# Patient Record
Sex: FEMALE | Race: WHITE
[De-identification: names, ages, dates, MRNs, and addresses within clinical notes are randomized per-mention and may not be internally consistent; named-entity substitution may affect disease eponyms.]

---

## 2018-03-20 LAB
BASOPHILS # BLD: 0.05 K/UL (ref 0–0.2)
BASOPHILS NFR BLD: 0.7 %
BUN SERPL-MCNC: 11 MG/DL (ref 7–18)
CALCIUM SERPL-MCNC: 8.7 MG/DL (ref 8.5–10.1)
CO2 SERPL-SCNC: 22 MMOL/L (ref 21–32)
CREAT SERPL-MCNC: 0.62 MG/DL (ref 0.6–1.2)
EOS ABS #: 0.45 K/UL (ref 0–0.5)
EOSINOPHIL NFR BLD AUTO: 298 K/UL (ref 130–400)
GLUCOSE SERPL-MCNC: 160 MG/DL (ref 70–99)
HCT VFR BLD CALC: 42.5 % (ref 37–47)
HGB BLD-MCNC: 13.9 G/DL (ref 12–16)
IG#: 0.03 K/UL (ref 0–0.02)
IMM GRANULOCYTES NFR BLD AUTO: 35.9 %
LYMPHOCYTES # BLD: 2.64 K/UL (ref 1.2–3.4)
MCH RBC QN AUTO: 30.3 PG (ref 25–34)
MCHC RBC AUTO-ENTMCNC: 32.7 G/DL (ref 32–36)
MCV RBC AUTO: 92.6 FL (ref 80–100)
MONO ABS #: 0.47 K/UL (ref 0.11–0.59)
MONOCYTES NFR BLD: 6.4 %
NEUT ABS #: 3.71 K/UL (ref 1.4–6.5)
NEUTROPHILS # BLD AUTO: 6.1 %
NEUTROPHILS NFR BLD AUTO: 50.5 %
PMV BLD AUTO: 10.2 FL (ref 7.4–10.4)
POTASSIUM SERPL-SCNC: 3.7 MMOL/L (ref 3.5–5.1)
RED CELL DISTRIBUTION WIDTH CV: 13.5 % (ref 11.5–14.5)
RED CELL DISTRIBUTION WIDTH SD: 45.9 FL (ref 36.4–46.3)
SODIUM SERPL-SCNC: 138 MMOL/L (ref 136–145)
WBC # BLD AUTO: 7.35 K/UL (ref 4.8–10.8)

## 2018-03-20 NOTE — PAT MEDICATION INSTRUCTIONS
Service Date


Mar 20, 2018.





Current Home Medication List


Albuterol Hfa (Ventolin Hfa), 2 PUFFS INH Q4H PRN for SOB/Wheezing


Butalbital-Acetaminophen-Caffe (Fioricet), 1-2 CAP PO UD


Cholecalciferol (Vitamin D3), 2,000 INTER.UNIT PO QAM


Cyanocobalamin (Vitamin B-12), 1,000 MCG PO QAM


Cyclobenzaprine HCl (Cyclobenzaprine HCl), 10 MG PO TID PRN for Muscle Spasm


Diphenhydramine Hcl (Benadryl), 50 MG PO QPM


Duloxetine HCl (Duloxetine HCl), 30 MG PO QAM


Duloxetine HCl (Duloxetine HCl), 60 MG PO QAM


Fexofenadine-Pseudoephedrine (Allegra-D 24 Hour Allergy), 1 TAB PO QPM


Fluticasone Prop/Salmeterol (Advair Diskus 250/50 60 Dose), 1 PUFF INH BID


Fluticasone Propionate (Fluticasone Propionate), 2 SPRAYS LYNN QPM


Labetalol HCl (Labetalol HCl), 200 MG PO BID


Levonorgestrel (Iud) (Mirena), 20 MCG IU UD


Melatonin (Melatonin Maximum Strengt), 1 TAB PO HS


Multivitamin (Multivitamin), 1 TAB PO QPM


Olopatadine HCl (Olopatadine Hydrochloride), 1 DROP OP DAILY PRN for Allergy 

Symptoms


Pregabalin (Lyrica), 150 MG PO TID


Probiotic Product (Probiotic), 1 TAB PO BID


Quetiapine Fumarate (Seroquel), 25 MG PO BID PRN for PRN


Ranitidine HCl (Ranitidine HCl), 150 MG PO BID


Sertraline (Zoloft), 1 TAB PO QAM


Topiramate (Topamax), 300 MG PO BID


Triamcinolone Acet (Triamcinolone Acetonide), 1 APPLN TOP DAILY PRN for 

UNDECIDED


Turmeric (Curcuma Longa) (Turmeric Curcumin), 500 MG PO QPM


Zafirlukast (Accolate), 20 MG PO BID


Zolpidem Tartrate (Zolpidem Tartrate), 10 MG PO HS PRN for PRN


[Albuterol Neb], 2 PUFFS INH PRN


[Botox], 1 DOSE INJ [id number]


[Nystatin Powd], 1 DOSE TOP PRN


[Vitamin E], 500 MG PO QAM





Medication Instructions


For Your Scheduled Surgery 





-Continue as directed:


Levonorgestrel (Iud) (Mirena), 20 MCG IU UD


Butalbital-Acetaminophen-Caffe (Fioricet), 1-2 CAP PO UD


[Botox], 1 DOSE INJ [id number]








- Hold the following medications 2 weeks prior to surgery:


Turmeric (Curcuma Longa) (Turmeric Curcumin), 500 MG PO QPM


[Vitamin E], 500 MG PO QAM








- Hold the following medications 24 hours prior to surgery:


Triamcinolone Acet (Triamcinolone Acetonide), 1 APPLN TOP DAILY PRN for 

UNDECIDED


[Nystatin Powd], 1 DOSE TOP PRN








- Hold the following medications the morning of surgery:


Cholecalciferol (Vitamin D3), 2,000 INTER.UNIT PO QAM


Cyanocobalamin (Vitamin B-12), 1,000 MCG PO QAM


Cyclobenzaprine HCl (Cyclobenzaprine HCl), 10 MG PO TID PRN for Muscle Spasm


Probiotic Product (Probiotic), 1 TAB PO BID








- Take the following medications the morning of surgery with a sip of water:


Albuterol Hfa (Ventolin Hfa), 2 PUFFS INH Q4H PRN for SOB/Wheezing (if needed, 

and bring it with you on the day of surgery)


Duloxetine HCl (Duloxetine HCl), 60 MG PO QAM


Fluticasone Prop/Salmeterol (Advair Diskus 250/50 60 Dose), 1 PUFF INH BID


Labetalol HCl (Labetalol HCl), 200 MG PO BID


Olopatadine HCl (Olopatadine Hydrochloride), 1 DROP OP DAILY PRN for Allergy 

Symptoms (if needed)


Pregabalin (Lyrica), 150 MG PO TID


Quetiapine Fumarate (Seroquel), 25 MG PO BID PRN for PRN (if needed)


Ranitidine HCl (Ranitidine HCl), 150 MG PO BID


Sertraline (Zoloft), 1 TAB PO QAM


Topiramate (Topamax), 300 MG PO BID


Zafirlukast (Accolate), 20 MG PO BID


[Albuterol Neb], 2 PUFFS INH PRN (if needed)








- Take the following medications as scheduled the night before surgery:


Albuterol Hfa (Ventolin Hfa), 2 PUFFS INH Q4H PRN for SOB/Wheezing (if needed)


Cyclobenzaprine HCl (Cyclobenzaprine HCl), 10 MG PO TID PRN for Muscle Spasm (

if needed)


Diphenhydramine Hcl (Benadryl), 50 MG PO QPM


Duloxetine HCl (Duloxetine HCl), 30 MG PO QPM


Fexofenadine-Pseudoephedrine (Allegra-D 24 Hour Allergy), 1 TAB PO QPM


Fluticasone Prop/Salmeterol (Advair Diskus 250/50 60 Dose), 1 PUFF INH BID


Fluticasone Propionate (Fluticasone Propionate), 2 SPRAYS LYNN QPM


Labetalol HCl (Labetalol HCl), 200 MG PO BID


Melatonin (Melatonin Maximum Strengt), 1 TAB PO HS


Multivitamin (Multivitamin), 1 TAB PO QPM


Olopatadine HCl (Olopatadine Hydrochloride), 1 DROP OP DAILY PRN for Allergy 

Symptoms (if needed)


Pregabalin (Lyrica), 150 MG PO TID


Probiotic Product (Probiotic), 1 TAB PO BID


Quetiapine Fumarate (Seroquel), 25 MG PO BID PRN for PRN (if needed)


Ranitidine HCl (Ranitidine HCl), 150 MG PO BID


Zafirlukast (Accolate), 20 MG PO BID


Zolpidem Tartrate (Zolpidem Tartrate), 10 MG PO HS PRN for PRN (if needed)


[Albuterol Neb], 2 PUFFS INH PRN (if needed)








If you have any questions please call us at [phone] or [phone] or 

[phone]

## 2018-04-03 ENCOUNTER — HOSPITAL ENCOUNTER (EMERGENCY)
Dept: HOSPITAL 45 - C.EDB | Age: 43
Discharge: HOME | End: 2018-04-03
Payer: COMMERCIAL

## 2018-04-03 VITALS
HEIGHT: 67.99 IN | BODY MASS INDEX: 35.75 KG/M2 | BODY MASS INDEX: 35.75 KG/M2 | WEIGHT: 235.89 LBS | HEIGHT: 67.99 IN | WEIGHT: 235.89 LBS

## 2018-04-03 VITALS — SYSTOLIC BLOOD PRESSURE: 114 MMHG | DIASTOLIC BLOOD PRESSURE: 87 MMHG | OXYGEN SATURATION: 97 % | HEART RATE: 90 BPM

## 2018-04-03 VITALS — TEMPERATURE: 98.6 F

## 2018-04-03 DIAGNOSIS — F32.9: ICD-10-CM

## 2018-04-03 DIAGNOSIS — J06.9: Primary | ICD-10-CM

## 2018-04-03 DIAGNOSIS — J45.901: ICD-10-CM

## 2018-04-03 NOTE — EMERGENCY ROOM VISIT NOTE
ED Visit Note


First contact with patient:  09:45


I have seen and examined this patient with Kyle Regan and generally agree 

with the treatment plan as discussed.


Problem List


Medical Problems:


(1) Asthma


Status: Chronic  





(2) Bipolar disorder


Status: Chronic  











Current/Historical Medications


Scheduled


Azithromycin (Zithromax Z-Nathaniel), 0 PO UD


Butalbital-Acetaminophen-Caffe (Fioricet), 1-2 CAP PO UD


Cholecalciferol (Vitamin D3), 2,000 INTER.UNIT PO QAM


Cyanocobalamin (Vitamin B-12), 1,000 MCG PO QAM


Diphenhydramine Hcl (Benadryl), 50 MG PO QPM


Duloxetine HCl (Duloxetine HCl), 30 MG PO QAM


Duloxetine HCl (Duloxetine HCl), 60 MG PO QAM


Fexofenadine-Pseudoephedrine (Allegra-D 24 Hour Allergy), 1 TAB PO QPM


Fluticasone Prop/Salmeterol (Advair Diskus 250/50 60 Dose), 1 PUFF INH BID


Fluticasone Propionate (Fluticasone Propionate), 2 SPRAYS LYNN QPM


Labetalol HCl (Labetalol HCl), 200 MG PO BID


Levonorgestrel (Iud) (Mirena), 20 MCG IU UD


Melatonin (Melatonin Maximum Strengt), 1 TAB PO HS


Methylprednisolone (Medrol Dosepak), 0 PO DAILY


Multivitamin (Multivitamin), 1 TAB PO QPM


Pregabalin (Lyrica), 150 MG PO TID


Probiotic Product (Probiotic), 1 TAB PO BID


Ranitidine HCl (Ranitidine HCl), 150 MG PO BID


Sertraline (Zoloft), 50 MG PO QAM


Topiramate (Topamax), 300 MG PO BID


Turmeric (Curcuma Longa) (Turmeric Curcumin), 500 MG PO QPM


Zafirlukast (Accolate), 20 MG PO BID





Scheduled PRN


Albuterol Hfa (Ventolin Hfa), 2 PUFFS INH Q4H PRN for SOB/Wheezing


Cyclobenzaprine HCl (Cyclobenzaprine HCl), 10 MG PO TID PRN for Muscle Spasm


Olopatadine HCl (Olopatadine Hydrochloride), 1 DROP OP DAILY PRN for Allergy 

Symptoms


Quetiapine Fumarate (Seroquel), 25 MG PO BID PRN for PRN


Triamcinolone Acet (Triamcinolone Acetonide), 1 APPLN TOP DAILY PRN for 

UNDECIDED


Zolpidem Tartrate (Zolpidem Tartrate), 10 MG PO HS PRN for PRN





Allergies


Coded Allergies:  


     Banana (Verified  Allergy, Unknown, MOUTH BURNS, 4/3/18)


     Macrolides (Verified  Allergy, Unknown, BIAXIN-GI UPSET RASH, 4/3/18)


     NUTS (Verified  Allergy, Unknown, MOUTH BURNS HIVES MIGRAINES WITH SMELL 

OF NUTS, 4/3/18)


     Sulfa Antibiotics (Verified  Allergy, Unknown, RASH, 4/3/18)


     Amoxicillin (Verified  Adverse Reaction, Intermediate, SEVERE STOMACH PAIN 

HEADACHES, 4/3/18)


 RECENT USE NO PROBLEM


     Doxycycline (Verified  Adverse Reaction, Intermediate, MIGRAINE, IMMEDIATE 

BODYACHE ESPECIALLY BAD IN JOINTS, 4/3/18)


     Clarithromycin (Verified  Adverse Reaction, Mild, GI-UPSET, 4/3/18)


     Levofloxacin (Verified  Adverse Reaction, Mild, MIGRAINE,BAD BODYACHES 

MUSCLE PAIN, 4/3/18)


     Celery (Verified  Adverse Reaction, Unknown, MOUTH BURNS, 4/3/18)


     Cephalexin (Verified  Adverse Reaction, Unknown, DOESN'T REMEMBER, 4/3/18)


     Prednisone (Verified  Adverse Reaction, Unknown, HARDER TO CONTROL BIPOLAR 

SXS, 4/3/18)





Vital Signs











  Date Time  Temp Pulse Resp B/P (MAP) Pulse Ox O2 Delivery O2 Flow Rate FiO2


 


4/3/18 10:15      Room Air  


 


4/3/18 09:41 37.0 89 20 136/90 97   











Medications Administered











 Medications


  (Trade)  Dose


 Ordered  Sig/Scott


 Route  Start Time


 Stop Time Status Last Admin


Dose Admin


 


 Albuterol/


 Ipratropium


  (Duoneb)  3 ml  NOW  STAT


 INH  4/3/18 10:23


 4/3/18 10:24 DC 4/3/18 10:31


3 ML











Departure Information


Impression





 Primary Impression:  


 Viral upper respiratory tract infection with cough


 Additional Impression:  


 Asthma





Dispostion


Home / Self-Care





Prescriptions





Azithromycin (ZITHROMAX Z-NATHANIEL) 250 Mg Tab


0 PO UD, #1 PKT


   2 TABS DAY 1, THEN 1 TAB DAILY FOR 4 DAYS


   Prov: Kyle Regan PA         4/3/18 


Methylprednisolone (MEDROL DOSEPAK) 4 Mg Nathaniel


0 PO DAILY, #1 PKT


   Prov: Kyle Regan PA         4/3/18





Forms


HOME CARE DOCUMENTATION FORM,                                                 

               IMPORTANT VISIT INFORMATION





Patient Instructions


My [facility]





Additional Instructions





Complete Zithromax and Medrol Dosepak as prescribed.


Administer albuterol 2 puffs every 4 hours.


Follow-up with your PCP if symptoms are not improving within the next 3-5 days.


Return to the emergency department for any progressively worsening symptoms.





Problem Qualifiers

## 2018-04-03 NOTE — EMERGENCY ROOM VISIT NOTE
History


First contact with patient:  09:45


Chief Complaint:  COUGH


Stated Complaint:  COUGH,WHEEZING,SOB,FEVER


Nursing Triage Summary:  


pt to the ED with c/o I think i have bronchitis and called my dr and they told 


me to come to the ED bc they had nothing availble





+ productive cough with dark yellow sputum for 24 hrs





History of Present Illness


The patient is a 43 year old female who presents to the Emergency Room with 

complaints of cough, wheezing, shortness of breath and fever.  The patient 

reports that she developed upper respiratory symptoms 2 days ago.  She now has 

a productive cough with dark yellow sputum and wheezing.  The patient does have 

a history of asthma.  She tried to call her family doctor's office for an 

appointment, but was referred here to the emergency department because he did 

not have any office appointments.  The patient has been administering albuterol 

inhalers at home without any significant relief.  She has had chills, but has 

not had a fever when checking her oral temperature.





Review of Systems


10 system review was performed and was negative except for pertinent positives 

and negatives as indicated in history of present illness





Past Medical/Surgical History


Medical Problems:


(1) Asthma


(2) Bipolar disorder








Family History


Unremarkable





Social History


Smoking Status:  Never Smoker


Alcohol Use:  occasionally


Marital Status:  


Housing Status:  lives with family


Occupation Status:  disabled





Current/Historical Medications


Scheduled


Azithromycin (Zithromax Z-Keren), 0 PO UD


Butalbital-Acetaminophen-Caffe (Fioricet), 1-2 CAP PO UD


Cholecalciferol (Vitamin D3), 2,000 INTER.UNIT PO QAM


Cyanocobalamin (Vitamin B-12), 1,000 MCG PO QAM


Diphenhydramine Hcl (Benadryl), 50 MG PO QPM


Duloxetine HCl (Duloxetine HCl), 30 MG PO QAM


Duloxetine HCl (Duloxetine HCl), 60 MG PO QAM


Fexofenadine-Pseudoephedrine (Allegra-D 24 Hour Allergy), 1 TAB PO QPM


Fluticasone Prop/Salmeterol (Advair Diskus 250/50 60 Dose), 1 PUFF INH BID


Fluticasone Propionate (Fluticasone Propionate), 2 SPRAYS LYNN QPM


Labetalol HCl (Labetalol HCl), 200 MG PO BID


Levonorgestrel (Iud) (Mirena), 20 MCG IU UD


Melatonin (Melatonin Maximum Strengt), 1 TAB PO HS


Methylprednisolone (Medrol Dosepak), 0 PO DAILY


Multivitamin (Multivitamin), 1 TAB PO QPM


Pregabalin (Lyrica), 150 MG PO TID


Probiotic Product (Probiotic), 1 TAB PO BID


Ranitidine HCl (Ranitidine HCl), 150 MG PO BID


Sertraline (Zoloft), 50 MG PO QAM


Topiramate (Topamax), 300 MG PO BID


Turmeric (Curcuma Longa) (Turmeric Curcumin), 500 MG PO QPM


Zafirlukast (Accolate), 20 MG PO BID





Scheduled PRN


Albuterol Hfa (Ventolin Hfa), 2 PUFFS INH Q4H PRN for SOB/Wheezing


Cyclobenzaprine HCl (Cyclobenzaprine HCl), 10 MG PO TID PRN for Muscle Spasm


Olopatadine HCl (Olopatadine Hydrochloride), 1 DROP OP DAILY PRN for Allergy 

Symptoms


Quetiapine Fumarate (Seroquel), 25 MG PO BID PRN for PRN


Triamcinolone Acet (Triamcinolone Acetonide), 1 APPLN TOP DAILY PRN for 

UNDECIDED


Zolpidem Tartrate (Zolpidem Tartrate), 10 MG PO HS PRN for PRN





Physical Exam


Vital Signs











  Date Time  Temp Pulse Resp B/P (MAP) Pulse Ox O2 Delivery O2 Flow Rate FiO2


 


4/3/18 10:15      Room Air  


 


4/3/18 09:41 37.0 89 20 136/90 97   











Physical Exam


CONSTITUTIONAL:  Healthy and well nourished.  Patient does not appear toxic or 

in any significant respiratory distress.


HEENT:  Normocephalic, atraumatic.  Pupils equal, round and reactive.  

Examination shows bilateral TM bulging without air-fluid levels or purulent 

effusion.  Mild clear rhinorrhea noted.


OROPHARYNX: Minimal posterior pharyngeal erythema without tonsillar hypertrophy 

or exudates.


LYMPHATICS: No cervical chain adenopathy noted.


NECK:  Full active range of motion without discomfort.


RESPIRATORY: Auscultation shows mild end expiratory wheezing in all fields.  No 

crackles, rhonchi or stridor.


CARDIOVASCULAR:  Regular rate and rhythm with no murmurs, rubs or gallops.


GASTROINTESTINAL:  Bowel sounds present in all quadrants.  


MUSCULOSKELETAL:  Full range of motion of all joints without discomfort.


INTEGUMENTARY:  No rash or other significant dermatologic conditions noted.


NEUROLOGIC:  No focal neurologic deficits noted.





Medical Decision & Procedures


Procedure


The patient was administered a unit dose DuoNeb treatment





ED Course


Patient history and physical exam were performed.  Nurse's notes were reviewed.

  Vital signs were reviewed, showing that the patient is afebrile.  O2 

saturation is 97% on room air.  The pressure is mildly elevated at 136/90.  The 

patient was administered a unit dose DuoNeb treatment with moderate relief of 

her cough.  She will be provided prescriptions for a Z-Keren and Medrol Dosepak.  

The patient was encouraged to follow-up with her PCP if symptoms are not 

improving within the next 3-5 days.  Return to the emergency department for any 

significantly worsening symptoms.  The patient was happy with plan of care, and 

voiced understanding of all discharge instructions.





Medical Decision








Blood Pressure Screening


Patient's blood pressure:  Elevated blood pressure


Blood pressure disposition:  Did not require urgent referral





Impression





 Primary Impression:  


 Viral upper respiratory tract infection with cough


 Additional Impression:  


 Asthma





Departure Information


Dispostion


Home / Self-Care





Prescriptions





Azithromycin (ZITHROMAX Z-KEREN) 250 Mg Tab


0 PO UD, #1 PKT


   2 TABS DAY 1, THEN 1 TAB DAILY FOR 4 DAYS


   Prov: Kyle Regan PA         4/3/18 


Methylprednisolone (MEDROL DOSEPAK) 4 Mg Keren


0 PO DAILY, #1 PKT


   Prov: Kyle Regan PA         4/3/18





Forms


HOME CARE DOCUMENTATION FORM,                                                 

               IMPORTANT VISIT INFORMATION





Patient Instructions


[facility]





Additional Instructions





Complete Zithromax and Medrol Dosepak as prescribed.


Administer albuterol 2 puffs every 4 hours.


Follow-up with your PCP if symptoms are not improving within the next 3-5 days.


Return to the emergency department for any progressively worsening symptoms.





Problem Qualifiers








 Additional Impression:  


 Asthma


 Asthma severity:  mild  Asthma persistence:  unspecified  Asthma complication 

type:  with acute exacerbation  Qualified Codes:  J45.901 - Unspecified asthma 

with (acute) exacerbation

## 2018-04-16 ENCOUNTER — HOSPITAL ENCOUNTER (OUTPATIENT)
Dept: HOSPITAL 45 - X.SURG | Age: 43
Discharge: HOME | End: 2018-04-16
Attending: OTOLARYNGOLOGY
Payer: COMMERCIAL

## 2018-04-16 VITALS
HEIGHT: 67.01 IN | BODY MASS INDEX: 37.06 KG/M2 | HEIGHT: 67.01 IN | WEIGHT: 236.12 LBS | WEIGHT: 236.12 LBS | BODY MASS INDEX: 37.06 KG/M2

## 2018-04-16 VITALS — SYSTOLIC BLOOD PRESSURE: 120 MMHG | DIASTOLIC BLOOD PRESSURE: 79 MMHG | OXYGEN SATURATION: 94 % | HEART RATE: 74 BPM

## 2018-04-16 VITALS — TEMPERATURE: 97.52 F

## 2018-04-16 DIAGNOSIS — M79.7: ICD-10-CM

## 2018-04-16 DIAGNOSIS — Z90.89: ICD-10-CM

## 2018-04-16 DIAGNOSIS — M19.90: ICD-10-CM

## 2018-04-16 DIAGNOSIS — E66.9: ICD-10-CM

## 2018-04-16 DIAGNOSIS — I10: ICD-10-CM

## 2018-04-16 DIAGNOSIS — Z91.018: ICD-10-CM

## 2018-04-16 DIAGNOSIS — Z88.8: ICD-10-CM

## 2018-04-16 DIAGNOSIS — Z98.890: ICD-10-CM

## 2018-04-16 DIAGNOSIS — J35.01: Primary | ICD-10-CM

## 2018-04-16 DIAGNOSIS — F31.9: ICD-10-CM

## 2018-04-16 DIAGNOSIS — Z88.2: ICD-10-CM

## 2018-04-16 DIAGNOSIS — Z88.1: ICD-10-CM

## 2018-04-16 DIAGNOSIS — J45.909: ICD-10-CM

## 2018-04-16 DIAGNOSIS — Z79.899: ICD-10-CM

## 2018-04-16 RX ADMIN — FENTANYL CITRATE PRN MCG: 50 INJECTION, SOLUTION INTRAMUSCULAR; INTRAVENOUS at 12:29

## 2018-04-16 RX ADMIN — FENTANYL CITRATE PRN MCG: 50 INJECTION, SOLUTION INTRAMUSCULAR; INTRAVENOUS at 12:37

## 2018-04-16 RX ADMIN — FENTANYL CITRATE PRN MCG: 50 INJECTION, SOLUTION INTRAMUSCULAR; INTRAVENOUS at 12:15

## 2018-04-16 RX ADMIN — FENTANYL CITRATE PRN MCG: 50 INJECTION, SOLUTION INTRAMUSCULAR; INTRAVENOUS at 12:21

## 2018-04-16 NOTE — [facility] OPERATIVE REPORT
Operative Report


Operative Date


Apr 16, 2018.





Pre-Operative Diagnosis





Chronic Tonsillitis





Post-Operative Diagnosis





same as preop





Procedure(s) Performed





Tonsillectomy





Surgeon


Dr. Dykes





Assistant Surgeon(s)


none





Estimated Blood Loss


5ml





Findings


1. NO ADENOIDS


2. 2-3+ CRYPTIC TONSILS WITH TONSILLITH FORMATION





Specimens





A: Right Tonsil





B: Left Tonsil





Anesthesia Type


General


I attest to the content of the Intraoperative Record and any orders documented 

therein.  Any exceptions are noted below.

## 2018-04-16 NOTE — DISCHARGE INSTRUCTIONS
Discharge Instructions


Date of Service


Apr 16, 2018.





Admission


Reason for Admission:  Chronic Tonsillitis





Discharge


Discharge Diagnosis / Problem:  SAME





Discharge Goals


Goal(s):  Therapeutic intervention





Activity Recommendations


Activity Limitations:  as noted below


LIGHT ACTIVITY FOR 2 WEEKS; NO DRIVING WHILE ON HYDROCODONE/APAP


.





Current Hospital Diet


Patient's current hospital diet: Full Liquid Diet





Discharge Diet


Recommended Diet:  Full Liquid Diet


Diet Texture:  Mechanical Soft (ground)





Procedures


Procedures Performed:  


Tonsillectomy





Pending Studies


Studies pending at discharge:  no





Laboratory Results





Hemoglobin A1c








Test


  1/16/18


07:40 Range/Units


 


 


Estimated Average Glucose 134   mg/dl


 


Hemoglobin A1c 6.3 H 4.5-5.6  %








Lipid Panel








Test


  1/16/18


07:40 Range/Units


 


 


Triglycerides Level 223 H 0-150  mg/dl


 


Cholesterol Level 189  0-200  mg/dl


 


HDL Cholesterol 39   mg/dl


 


Cholesterol/HDL Ratio 4.8   


 


LDL Cholesterol, Calculated 105   mg/dl











Medical Emergencies








.


Who to Call and When:





Medical Emergencies:  If at any time you feel your situation is an emergency, 

please call 911 immediately.





.





Non-Emergent Contact


Non-Emergency issues call your:  Surgeon





.


.








"Provider Documentation" section prepared by Toni Dykes.








.

## 2018-04-16 NOTE — OPERATIVE REPORT
DATE OF OPERATION:  04/16/2018

 

PREOPERATIVE DIAGNOSIS:  Chronic tonsillitis.

 

POSTOPERATIVE DIAGNOSIS:  Chronic tonsillitis.

 

PROCEDURE:  Bilateral tonsillectomy.

 

SURGEON:  Toni Dykes MD

 

ANESTHESIA:  General endotracheal.

 

ESTIMATED BLOOD LOSS:  5 mL.

 

FINDINGS:  2 to 3+ cryptic tonsils bilaterally with excessive tonsillith

formation.

 

SPECIMENS:  Right and left tonsil sent separately for permanent pathologic

specimen.

 

COMPLICATIONS:  None.

 

INDICATIONS FOR THE PROCEDURE:  The patient is a 43-year-old female with a

history of chronic tonsillitis, which has been refractory to maximal medical

therapy including antibiotics as well as preventive measures with gargling

after eating.  She presents for the above-mentioned procedure on an

outpatient elective basis.

 

DETAILS OF THE PROCEDURE:  After informed consent had been obtained from the

patient, the patient was wheeled to the operating room and placed on the

operating table in the supine position.  Monitors were placed.  After

induction of general endotracheal anesthesia, the table was turned to 90

degrees and the patient's head and neck were gently extended.  Antibiotic

ointment was applied to the lips and a mouth gag was carefully inserted,

opened, and stabilized on a roll of towels.  The palate was inspected and

found to be normal.  A catheter was then inserted into the right nasal cavity

and this was used to elevate the soft palate and uvula.  A laryngeal mirror

was used to inspect the nasopharynx and the intraoperative findings were of

no evidence of adenoid tissue.  The catheter was then removed.  An Allis

clamp was then used to grasp the right tonsil and the superior pole and Bovie

electrocautery was used to remove the tonsil in the capsular plane with care

to preserve the underlying mucosa and musculature of the anterior and

posterior tonsillar pillars.  The left tonsil was then removed in a similar

fashion.  The intraoperative findings of 2-3+ tonsils bilaterally with

excessive tonsillith formation.  These were sent separately for permanent

pathological assessment.

 

The mouth gag was then released for 1 minute.  This was reopened and

hemostasis was confirmed.  An orogastric tube was placed and the stomach was

suctioned free of air and stomach contents.  2% lidocaine jelly was placed

into the bilateral tonsillar fossae for added anesthetic effects.

 

This marked the end of the case.  The patient tolerated the procedure well

and there were no apparent complications.  The patient was extubated and

transferred to recovery room in stable condition.

 

 

I attest to the content of the Intraoperative Record and any orders documented therein. Any exception
s are noted below.

## 2018-04-16 NOTE — HISTORY AND PHYSICAL: SURG CNT
History & Physical


Date


2018.





Chief Complaint


CHRONIC TONSILLITIS





History of Present Illness


The patient is a 43 year old female with complaints of CHRONIC TONSILLITIS WITH 

SYMPTOMS DESPITE MAXIMAL MEDICAL RX AND PREVENTION WITH GARGLING AFTER EATING.








Past Medical/Surgical History


Medical Problems:


(1) Asthma


(2) Bipolar disorder


ANXIETY, FIBROMYALGIA, ALLERGIC RHINITIS, OBESITY, CHEMA, HEADACHES, VIT D 

DEFICIENCY


PSH:


S/P APPY, S/P BREAST REDUCTION, S/P , S/P FOOT AND KNEE SURGERY, S/P 

LYMPH NODE EXCISION, S/P ORAL SURGERY, S/P WRIST SURGERY





Additional History


Hepatic Disease:  No


Endocrine Disorder:  No


Kidney Disease:  No


Hypertension:  No


Heart Disease:  No


Bleeding Tendencies:  No


Infectious Diseases:  No





Allergies


Coded Allergies:  


     Banana (Verified  Allergy, Unknown, MOUTH BURNS, 18)


 Patient states no longer allergic


     Macrolides (Verified  Allergy, Unknown, BIAXIN-GI UPSET RASH, 18)


     NUTS (Verified  Allergy, Unknown, MOUTH BURNS HIVES MIGRAINES WITH SMELL 

OF NUTS, 18)


     Sulfa Antibiotics (Verified  Allergy, Unknown, RASH, 18)


     Amoxicillin (Verified  Adverse Reaction, Intermediate, SEVERE STOMACH PAIN 

HEADACHES, 18)


 RECENT USE NO PROBLEM


     Doxycycline (Verified  Adverse Reaction, Intermediate, MIGRAINE, IMMEDIATE 

BODYACHE ESPECIALLY BAD IN JOINTS, 18)


     Clarithromycin (Verified  Adverse Reaction, Mild, GI-UPSET, 18)


     Levofloxacin (Verified  Adverse Reaction, Mild, MIGRAINE,BAD BODYACHES 

MUSCLE PAIN, 18)


 Patient states no longer allergic


     Celery (Verified  Adverse Reaction, Unknown, MOUTH BURNS, 18)


     Cephalexin (Verified  Adverse Reaction, Unknown, DOESN'T REMEMBER, 18)


     Prednisone (Verified  Adverse Reaction, Unknown, HARDER TO CONTROL BIPOLAR 

SXS, 18)





Home Medications


Scheduled


Butalbital-Acetaminophen-Caffe (Fioricet), 1-2 CAP PO UD


Cholecalciferol (Vitamin D3), 2,000 INTER.UNIT PO QAM


Cyanocobalamin (Vitamin B-12), 1,000 MCG PO QAM


Diphenhydramine Hcl (Benadryl), 50 MG PO QPM


Duloxetine HCl (Duloxetine HCl), 30 MG PO QAM


Duloxetine HCl (Duloxetine HCl), 60 MG PO QAM


Fexofenadine-Pseudoephedrine (Allegra-D 24 Hour Allergy), 1 TAB PO QPM


Fluticasone Prop/Salmeterol (Advair Diskus 250/50 60 Dose), 1 PUFF INH BID


Fluticasone Propionate (Fluticasone Propionate), 2 SPRAYS LYNN QPM


Labetalol HCl (Labetalol HCl), 200 MG PO BID


Levonorgestrel (Iud) (Mirena), 20 MCG IU UD


Melatonin (Melatonin Maximum Strengt), 1 TAB PO HS


Multivitamin (Multivitamin), 1 TAB PO QPM


Pregabalin (Lyrica), 150 MG PO TID


Probiotic Product (Probiotic), 1 TAB PO BID


Ranitidine HCl (Ranitidine HCl), 150 MG PO BID


Sertraline (Zoloft), 50 MG PO QAM


Topiramate (Topamax), 300 MG PO BID


Turmeric (Curcuma Longa) (Turmeric Curcumin), 500 MG PO QPM


Zafirlukast (Accolate), 20 MG PO BID





Scheduled PRN


Albuterol Hfa (Ventolin Hfa), 2 PUFFS INH Q4H PRN for SOB/Wheezing


Cyclobenzaprine HCl (Cyclobenzaprine HCl), 10 MG PO TID PRN for Muscle Spasm


Olopatadine HCl (Olopatadine Hydrochloride), 1 DROP OP DAILY PRN for Allergy 

Symptoms


Quetiapine Fumarate (Seroquel), 25 MG PO BID PRN for PRN


Triamcinolone Acet (Triamcinolone Acetonide), 1 APPLN TOP DAILY PRN for 

UNDECIDED


Zolpidem Tartrate (Zolpidem Tartrate), 10 MG PO HS PRN for PRN





Physical Examination


Skin:  warm/dry, no rash


Eyes:  normal inspection, EOMI, sclerae normal


ENT:  + pertinent finding (2-3+ CRYPTIC TONSILS WITH TONSILLITHS)


Head:  normocephalic, atraumatic


Neck:  supple, no adenopathy, trachea midline


Respiratory/Chest:  lungs clear, normal breath sounds, no respiratory distress


Cardiovascular:  regular rate, rhythm, no edema, no murmur


Neurologic/Psych:  no motor/sensory deficits, alert, normal reflexes, oriented 

x 3





Diagnosis


CHRONIC TONSILLITIS





Plan of Treatment


TONSILLECTOMY, POSSIBLE ADENOIDECTOMY

## 2018-04-16 NOTE — ANESTHESIOLOGY PROGRESS NOTE
Anesthesia Post Op Note


Date & Time


Apr 16, 2018 at 13:37





Vital Signs


Pain Intensity:  7.0





Vital Signs Past 12 Hours








  Date Time  Temp Pulse Resp B/P (MAP) Pulse Ox O2 Delivery O2 Flow Rate FiO2


 


4/16/18 13:30  74 16 120/79 (93) 94 Room Air  


 


4/16/18 12:53  99 20 120/72 (88) 95 Room Air  


 


4/16/18 12:44 36.4 78 16 106/70 95 Room Air  


 


4/16/18 12:43  82 23     


 


4/16/18 12:43  84 23  95   


 


4/16/18 12:41    111/75    


 


4/16/18 12:38  83 15     


 


4/16/18 12:38  85 15  95   


 


4/16/18 12:36    113/67    


 


4/16/18 12:33  78 13  95   


 


4/16/18 12:33  79 13     


 


4/16/18 12:31    115/70    


 


4/16/18 12:28  80 22     


 


4/16/18 12:28  81 22  100   


 


4/16/18 12:26    111/72    


 


4/16/18 12:23  78 13     


 


4/16/18 12:23  78 13  99   


 


4/16/18 12:20    115/71    


 


4/16/18 12:18  75 14     


 


4/16/18 12:18  74 14  100   


 


4/16/18 12:16    103/70    


 


4/16/18 12:13  72 13  100   


 


4/16/18 12:13  71 13     


 


4/16/18 12:11    115/63    


 


4/16/18 12:08  76 15  98   


 


4/16/18 12:08  75 15     


 


4/16/18 12:06    112/71    


 


4/16/18 12:03  76 14     


 


4/16/18 12:03  75 14  98   


 


4/16/18 12:00    116/77    


 


4/16/18 11:58  76 16     


 


4/16/18 11:58  76 16  98   


 


4/16/18 11:56    113/81    


 


4/16/18 11:53  79 17  97   


 


4/16/18 11:53  78 17     


 


4/16/18 11:51    117/78    


 


4/16/18 11:48 36.4 74 12 129/74 96 Humidified Oxygen 10 





      Mask  


 


4/16/18 09:21 36.9 76 16 120/80 (93) 97 Room Air  











Notes


Mental Status:  alert / awake / arousable, participated in evaluation


Pt Amnestic to Procedure:  Yes


Nausea / Vomiting:  adequately controlled


Pain:  adequately controlled


Airway Patency, RR, SpO2:  stable & adequate


BP & HR:  stable & adequate


Hydration State:  stable & adequate


Anesthetic Complications:  no major complications apparent

## 2018-04-19 ENCOUNTER — HOSPITAL ENCOUNTER (EMERGENCY)
Dept: HOSPITAL 45 - C.EDB | Age: 43
Discharge: HOME | End: 2018-04-19
Payer: COMMERCIAL

## 2018-04-19 VITALS
BODY MASS INDEX: 36.09 KG/M2 | WEIGHT: 229.94 LBS | HEIGHT: 67.01 IN | BODY MASS INDEX: 36.09 KG/M2 | HEIGHT: 67.01 IN | WEIGHT: 229.94 LBS

## 2018-04-19 VITALS — TEMPERATURE: 97.88 F

## 2018-04-19 VITALS — DIASTOLIC BLOOD PRESSURE: 76 MMHG | HEART RATE: 74 BPM | SYSTOLIC BLOOD PRESSURE: 128 MMHG | OXYGEN SATURATION: 95 %

## 2018-04-19 DIAGNOSIS — Z79.899: ICD-10-CM

## 2018-04-19 DIAGNOSIS — J95.830: Primary | ICD-10-CM

## 2018-04-19 DIAGNOSIS — J45.909: ICD-10-CM

## 2018-04-19 DIAGNOSIS — F31.9: ICD-10-CM

## 2018-04-19 DIAGNOSIS — Z79.52: ICD-10-CM

## 2018-04-19 DIAGNOSIS — B37.0: ICD-10-CM

## 2018-04-19 DIAGNOSIS — Z79.51: ICD-10-CM

## 2018-04-19 NOTE — EMERGENCY ROOM VISIT NOTE
History


First contact with patient:  11:57


Chief Complaint:  OTHER COMPLAINT


Stated Complaint:  BLEEDING S/P TONSIL SURG





History of Present Illness


The patient is a 43 year old female who presents to the Emergency Room with 

complaints of "bleeding s/p tonsil surgery" the patient states that she had 

bilateral tonsils removed this past Monday for chronic tonsillitis.  She states 

that her postoperative course has been going well up until today around 1030-11 

AM she coughed and then began with blood coming from her mouth.  She came 

directly here after informing her ear nose and throat surgeons office she notes 

minimal pain that is left greater than right.  She rates the overall pain as a 3

/10.  She provides a bag full of tissues with the blood.





Review of Systems


A complete 6-point Review of Systems was discussed with the patient, with 

pertinent positives and negatives listed in the History of Present Illness. All 

remaining Review of Systems questions can be considered negative unless 

otherwise specified.





Past Medical/Surgical History


Medical Problems:


(1) Asthma


(2) Bipolar disorder








Social History


Smoking Status:  Never Smoker


Alcohol Use:  occasionally


Marital Status:  


Housing Status:  lives with family


Occupation Status:  disabled





Current/Historical Medications


Scheduled


Butalbital-Acetaminophen-Caffe (Fioricet), 1-2 CAP PO UD


Cholecalciferol (Vitamin D3), 2,000 INTER.UNIT PO QAM


Cyanocobalamin (Vitamin B-12), 1,000 MCG PO QAM


Diphenhydramine Hcl (Benadryl), 50 MG PO QPM


Duloxetine HCl (Duloxetine HCl), 30 MG PO QAM


Duloxetine HCl (Duloxetine HCl), 60 MG PO QAM


Fexofenadine-Pseudoephedrine (Allegra-D 24 Hour Allergy), 1 TAB PO QPM


Fluconazole (Diflucan), 150 MG PO AS DIRECTED


Fluticasone Prop/Salmeterol (Advair Diskus 250/50 60 Dose), 1 PUFF INH BID


Fluticasone Propionate (Fluticasone Propionate), 2 SPRAYS LYNN QPM


Labetalol HCl (Labetalol HCl), 200 MG PO BID


Levonorgestrel (Iud) (Mirena), 20 MCG IU UD


Melatonin (Melatonin Maximum Strengt), 1 TAB PO HS


Multivitamin (Multivitamin), 1 TAB PO QPM


Prednisolone Sodium Phosphate (Prednisolone Sodium Phosp), 10 ML PO BID


Pregabalin (Lyrica), 150 MG PO TID


Probiotic Product (Probiotic), 1 TAB PO BID


Ranitidine HCl (Ranitidine HCl), 150 MG PO BID


Sertraline (Zoloft), 50 MG PO QAM


Topiramate (Topamax), 300 MG PO BID


Turmeric (Curcuma Longa) (Turmeric Curcumin), 500 MG PO QPM


Zafirlukast (Accolate), 20 MG PO BID





Scheduled PRN


Albuterol Hfa (Ventolin Hfa), 2 PUFFS INH Q4H PRN for SOB/Wheezing


Cyclobenzaprine HCl (Cyclobenzaprine HCl), 10 MG PO TID PRN for Muscle Spasm


Hydrocodone-Acetaminophen (Hydrocodone/Acetami 7.5/325MG 15ML), 10 ML PO Q4H 

PRN for Pain


Olopatadine HCl (Olopatadine Hydrochloride), 1 DROP OP DAILY PRN for Allergy 

Symptoms


Quetiapine Fumarate (Seroquel), 25 MG PO BID PRN for PRN


Triamcinolone Acet (Triamcinolone Acetonide), 1 APPLN TOP DAILY PRN for 

UNDECIDED


Zolpidem Tartrate (Zolpidem Tartrate), 10 MG PO HS PRN for PRN





Physical Exam


Vital Signs











  Date Time  Temp Pulse Resp B/P (MAP) Pulse Ox O2 Delivery O2 Flow Rate FiO2


 


4/19/18 13:34  74 18 128/76 95   


 


4/19/18 11:45 36.6 79 18 136/81 94 Room Air  











Physical Exam


VITAL SIGNS - Vital signs and nursing notes were reviewed.  Stable.


GENERAL -43-year-old female appearing her stated age who is in no acute 

distress. Communicates well with provider and answers questions appropriately.


SKIN - Without rashes.  No meningeal or petechial rash.


HEAD - NC/AT.


EYES - Sclera anicteric. 


EARS - No deformities of external structures noted on gross examination 

bilaterally. 


NOSE - Midline and without cyanosis. No epistaxis or purulent drainage noted. 


MOUTH/OROPHARYNX - Without perioral cyanosis. Buccal mucosa pink and moist and 

without leukoplakia. Tongue midline with equal elevation of palate bilaterally.

  There is evidence of bilateral tonsillar extraction recently.  No bleeding on 

my exam.  No clotting.  There is no blood in the posterior pharynx or coming to 

the nose.





Medical Decision & Procedures


Medical Decision


Patient was seen and evaluated as above. Review was performed of nursing notes 

and vital signs. After obtaining a thorough history and physical examination 

the patient was already gargling with cold water.  I was unable to appreciate 

any bleeding on exam.  It was not in her saliva or spit.  None in the posterior 

pharynx.  I discussed this with the ear nose and throat surgeon, Dr. Dykes, 

who personally came to evaluate the patient.  He was able to identify a small 

oozing bleed at the right inferior tonsillar fossa region.  He was able to 

cauterize this.  Patient was discharged home.  A small prescription for 

Diflucan was given secondary to the oral thrush.  She is to follow with the ear 

nose and throat doctor tomorrow or return with worsening. The patient was 

educated upon management, had questions answered prior to discharge, and was 

discharged home in good condition.








In the evaluation and treatment of this patient the following differential 

diagnoses were entertained: Post tonsillectomy hemorrhage, anemia,





Impression





 Primary Impression:  


 post tonsillectomy bleeding


 Additional Impression:  


 Oral thrush





Departure Information


Dispostion


Home / Self-Care





Condition


GOOD





Prescriptions





Fluconazole (DIFLUCAN) 150 Mg Tab


150 MG PO AS DIRECTED, #2 TAB


   1 tablet today then another in 3 days if white patches in mouth


   persist


   Prov: [place] PA-C         4/19/18





Referrals


Pro,Cheo ZAVALA M.D. (PCP)








Toni Dykes MD





Patient Instructions


My [facility]





Additional Instructions





You were seen in the emergency department for bleeding from her tonsil 

following her surgery.





Take one Diflucan tablet today and if the thrush persists take again in 3 days.

  This is the white patch on the back of the top your mouth.





Pain meds as provided by Dr. Dykes.





Please keep your appointment tomorrow with him.





Please return with any new/concerning symptoms.





Problem Qualifiers

## 2018-04-19 NOTE — ENT CONSULTATION
DATE OF CONSULTATION:  04/19/2018

 

HISTORY OF PRESENT ILLNESS:  The patient is postoperative day #3 status post

bilateral tonsillectomy for chronic tonsillitis who presented to the [facility] Emergency Room with some post-tonsillectomy

hemorrhage.  She contacted our office to let us know that she was on her way

to the Emergency Room.  She was evaluated by the physician assistant in the

Emergency Room who did not see any active bleeding or blood clot within the

tonsillar fossa.  As soon as I was done in the operating in the surgery

center, I came to see the patient in room D3.  She was not actively bleeding

but did have a bag full of bloody tissues.  Estimated blood loss may have

been 2 tablespoons in total.  On examination, she did have a mild amount of

oozing from the right inferior tonsillar fossa with a mild amount of clot in

this region.  The left tonsillar fossa showed no evidence of bleeding or

clot.

 

PROCEDURE NOTE:  After verbally obtaining informed consent, the oral cavity

and oropharynx were sprayed with topical Cetacaine spray.  After allowing

adequate time for anesthesia, silver nitrate was used to cauterize the right

inferior tonsillar fossa with complete cessation of oozing of blood from this

region.  The patient gargled with ice water afterwards.  The patient

tolerated the procedure well with no apparent complication.

 

The patient does have evidence of mild oral thrush involving her soft palatal

mucosa.  She was given a new prescription for hydrocodone/acetaminophen 7.5

mg/325 mg/15 mL with 5-15 mL p.o. q. 4 p.r.n. with 270 mL given.  She was

also given a prescription by the Emergency Room for Diflucan 150 mg to take 1

time but may repeat in 3 days as needed if she still has evidence of thrush. 

She already has a followup appointment in my office tomorrow and will call if

she has any further problems.

## 2018-08-06 ENCOUNTER — HOSPITAL ENCOUNTER (OUTPATIENT)
Dept: HOSPITAL 45 - C.LABPVFM | Age: 43
Discharge: HOME | End: 2018-08-06
Attending: INTERNAL MEDICINE
Payer: COMMERCIAL

## 2018-08-06 DIAGNOSIS — R73.03: Primary | ICD-10-CM

## 2018-08-06 DIAGNOSIS — G62.9: ICD-10-CM

## 2018-08-06 LAB
ALT SERPL-CCNC: 60 U/L (ref 12–78)
AST SERPL-CCNC: 29 U/L (ref 15–37)
BUN SERPL-MCNC: 11 MG/DL (ref 7–18)
CALCIUM SERPL-MCNC: 9 MG/DL (ref 8.5–10.1)
CO2 SERPL-SCNC: 24 MMOL/L (ref 21–32)
CREAT SERPL-MCNC: 0.8 MG/DL (ref 0.6–1.2)
GLUCOSE SERPL-MCNC: 204 MG/DL (ref 70–99)
POTASSIUM SERPL-SCNC: 4.2 MMOL/L (ref 3.5–5.1)
SODIUM SERPL-SCNC: 136 MMOL/L (ref 136–145)

## 2018-08-07 LAB — HBA1C MFR BLD: 7.8 % (ref 4.5–5.6)

## 2018-08-15 ENCOUNTER — HOSPITAL ENCOUNTER (OUTPATIENT)
Dept: HOSPITAL 45 - C.LABPVFM | Age: 43
Discharge: HOME | End: 2018-08-15
Attending: PSYCHIATRY & NEUROLOGY
Payer: COMMERCIAL

## 2018-08-15 DIAGNOSIS — Z79.899: Primary | ICD-10-CM

## 2018-08-15 LAB
KETONES UR QL STRIP: 108 MG/DL
PH UR: 198 MG/DL (ref 0–200)

## 2018-08-20 ENCOUNTER — HOSPITAL ENCOUNTER (OUTPATIENT)
Dept: HOSPITAL 45 - C.NEUR | Age: 43
Discharge: HOME | End: 2018-08-20
Attending: INTERNAL MEDICINE
Payer: COMMERCIAL

## 2018-08-20 DIAGNOSIS — R06.83: ICD-10-CM

## 2018-08-20 DIAGNOSIS — R40.0: Primary | ICD-10-CM

## 2018-08-22 ENCOUNTER — HOSPITAL ENCOUNTER (EMERGENCY)
Dept: HOSPITAL 45 - C.EDB | Age: 43
Discharge: HOME | End: 2018-08-22
Payer: COMMERCIAL

## 2018-08-22 VITALS
WEIGHT: 227.08 LBS | HEIGHT: 65.98 IN | WEIGHT: 227.08 LBS | BODY MASS INDEX: 36.49 KG/M2 | HEIGHT: 65.98 IN | BODY MASS INDEX: 36.49 KG/M2

## 2018-08-22 VITALS — OXYGEN SATURATION: 97 % | DIASTOLIC BLOOD PRESSURE: 90 MMHG | HEART RATE: 83 BPM | SYSTOLIC BLOOD PRESSURE: 144 MMHG

## 2018-08-22 VITALS — TEMPERATURE: 98.96 F

## 2018-08-22 DIAGNOSIS — K83.9: ICD-10-CM

## 2018-08-22 DIAGNOSIS — M79.672: ICD-10-CM

## 2018-08-22 DIAGNOSIS — M79.7: ICD-10-CM

## 2018-08-22 DIAGNOSIS — R16.1: ICD-10-CM

## 2018-08-22 DIAGNOSIS — F31.9: ICD-10-CM

## 2018-08-22 DIAGNOSIS — K80.50: Primary | ICD-10-CM

## 2018-08-22 DIAGNOSIS — K76.0: ICD-10-CM

## 2018-08-22 DIAGNOSIS — E11.9: ICD-10-CM

## 2018-08-22 DIAGNOSIS — Z79.899: ICD-10-CM

## 2018-08-22 DIAGNOSIS — I10: ICD-10-CM

## 2018-08-22 DIAGNOSIS — J45.909: ICD-10-CM

## 2018-08-22 DIAGNOSIS — F41.9: ICD-10-CM

## 2018-08-22 LAB
ALBUMIN SERPL-MCNC: 3.5 GM/DL (ref 3.4–5)
ALP SERPL-CCNC: 103 U/L (ref 45–117)
ALT SERPL-CCNC: 38 U/L (ref 12–78)
AST SERPL-CCNC: 21 U/L (ref 15–37)
BASOPHILS # BLD: 0.03 K/UL (ref 0–0.2)
BASOPHILS NFR BLD: 0.3 %
BUN SERPL-MCNC: 10 MG/DL (ref 7–18)
CALCIUM SERPL-MCNC: 9 MG/DL (ref 8.5–10.1)
CO2 SERPL-SCNC: 23 MMOL/L (ref 21–32)
CREAT SERPL-MCNC: 0.77 MG/DL (ref 0.6–1.2)
EOS ABS #: 0.22 K/UL (ref 0–0.5)
EOSINOPHIL NFR BLD AUTO: 276 K/UL (ref 130–400)
GLUCOSE SERPL-MCNC: 169 MG/DL (ref 70–99)
HCT VFR BLD CALC: 40.5 % (ref 37–47)
HGB BLD-MCNC: 13.2 G/DL (ref 12–16)
IG#: 0.04 K/UL (ref 0–0.02)
IMM GRANULOCYTES NFR BLD AUTO: 27.6 %
LYMPHOCYTES # BLD: 2.43 K/UL (ref 1.2–3.4)
MCH RBC QN AUTO: 30.8 PG (ref 25–34)
MCHC RBC AUTO-ENTMCNC: 32.6 G/DL (ref 32–36)
MCV RBC AUTO: 94.4 FL (ref 80–100)
MONO ABS #: 0.45 K/UL (ref 0.11–0.59)
MONOCYTES NFR BLD: 5.1 %
NEUT ABS #: 5.64 K/UL (ref 1.4–6.5)
NEUTROPHILS # BLD AUTO: 2.5 %
NEUTROPHILS NFR BLD AUTO: 64 %
PMV BLD AUTO: 10.1 FL (ref 7.4–10.4)
POTASSIUM SERPL-SCNC: 3.5 MMOL/L (ref 3.5–5.1)
PROT SERPL-MCNC: 7 GM/DL (ref 6.4–8.2)
RED CELL DISTRIBUTION WIDTH CV: 14.3 % (ref 11.5–14.5)
RED CELL DISTRIBUTION WIDTH SD: 49 FL (ref 36.4–46.3)
SODIUM SERPL-SCNC: 141 MMOL/L (ref 136–145)
WBC # BLD AUTO: 8.81 K/UL (ref 4.8–10.8)

## 2018-08-22 NOTE — DIAGNOSTIC IMAGING REPORT
L FOOT MIN 3 VIEWS ROUTINE



CLINICAL HISTORY: 43 years-old Female presenting with LEFT, PAIN X 3 WEEKS. 



TECHNIQUE: Frontal, oblique, and lateral views of the left foot were obtained. 



COMPARISON: None.



FINDINGS:

A cannula screw through the interphalangeal joint of the first toe. Radiolucency

noted along the screw in the proximal phalanx. No osseous erosion at the

interphalangeal joint suggests septic arthritis. 



Accessory navicular noted. No acute fracture or malalignment. Small enthesophyte

at the origin of the plantar fascia. No radiographic soft tissue abnormality.



IMPRESSION:

Radiolucency surrounds the cannula screw at the interphalangeal joint of the

first toe. This raises concern for loosening or infection. 







Electronically signed by:  Gabriel Arana M.D.

8/22/2018 10:26 PM



Dictated Date/Time:  8/22/2018 10:23 PM

## 2018-08-22 NOTE — DIAGNOSTIC IMAGING REPORT
GALLBLADDER-ABD LIMITED



CLINICAL HISTORY: 43 years-old Female presenting with right upper quadrant pain.





TECHNIQUE: Real-time grayscale and limited color Doppler ultrasound imaging of

the abdomen limited to the right upper quadrant was performed. 



COMPARISON: 7/3/2018 and CT from 2017.



FINDINGS:



Pancreas: Visualized portions of the pancreatic head and body normal.



Liver: Moderately hyperechogenic parenchyma with partial obscuration of the

right hemidiaphragm, likely indicating moderate steatosis. The liver measures

18.2 cm in maximal sagittal dimension. No sonographic evidence of hepatic mass.

Main portal vein patent with normal directional flow.



Biliary: No intrahepatic biliary ductal dilatation. Common bile duct measures up

to 3 mm in diameter.



Gallbladder: Gallbladder sludge. No evidence of gallstones, gallbladder wall

thickening, gallbladder distention, or pericholecystic fluid or inflammatory

change. Sonographic Blankenship's sign negative.



Right kidney: Normal in appearance without evidence of hydronephrosis.



Ascites: None.



Other: None.



IMPRESSION: 

1.  Gallbladder sludge. No cholelithiasis or biliary ductal dilatation. No

convincing evidence of cholecystitis.



2.  Hepatic steatosis. Correlate with liver function tests to exclude

steatohepatitis as a cause for abdominal pain.







Electronically signed by:  Gabriel Arana M.D.

8/22/2018 9:57 PM



Dictated Date/Time:  8/22/2018 9:55 PM

## 2018-08-22 NOTE — DIAGNOSTIC IMAGING REPORT
ABDOMEN 2VIEW W/PA CHEST RTN



CLINICAL HISTORY: 43 years-old Female presenting with EVAL ABD PAIN, severe left

foot pain, diabetes, right upper quadrant pain, indigestion. 



TECHNIQUE: PA view of the chest and supine and upright views of the abdomen were

obtained.



COMPARISON: 11/23/2017.



FINDINGS:

Cardiomediastinal silhouette normal. No focal opacity. No large effusion or

pneumothorax. 



Mottled lucencies in the left upper quadrant may relate to ingested material

within the stomach lumen. Gaseous distention of large bowel. Mild stool burden

in the left colon. No gross pneumoperitoneum.



Allowing for bowel gas and stool, no calcifications to suggest nephrolithiasis.

Multiple pelvic phleboliths.



Osseous structures normal.



IMPRESSION:

1.  No acute cardiopulmonary disease.



2.  No convincing evidence of bowel obstruction or free air. 







Electronically signed by:  Gabriel Arana M.D.

8/22/2018 10:29 PM



Dictated Date/Time:  8/22/2018 10:27 PM

## 2018-08-22 NOTE — EMERGENCY ROOM VISIT NOTE
History


First contact with patient:  20:01


 (Amelia Winn PA)


First contact with patient:  20:01


 (Santi Jacobsen M.D.)


Chief Complaint:  ABDOMINAL PAIN


Stated Complaint:  SEVERE LT FOOT PAIN, GALL BLADER SYMPTOMS, DIABETI


Nursing Triage Summary:  


Pt states her gallbladder "amped" today. Abdomen tender. Pt states she has been 


pooping a lot. 





Pt also states possible stress fx to the left foot. Swollen, painful and 


bruised.


 (Amelia Winn PA)





History of Present Illness


Patient is a 43-year-old female with past medical history significant for 

bipolar disorder/depression/anxiety, chronic neck pain, migraine disorder, 

sleep apnea, diabetes, asthma, peptic ulcer disease, hypertension, among other 

medical issues, who presents the emergency department for evaluation of 2 

complaints.





First, patient notes that she has been dealing with upper abdominal pain and 

indigestion for several months.  Her doctor was trying to determine whether she 

had an ulcer or whether her symptoms were related to her gallbladder.  She 

reports a very strong family history of gallbladder disease on her mother side 

of the family.  She has been having upper abdominal pain and indigestion.  She 

relates that she has been on Protonix, and was told that her ulcer disease was 

likely improving.  She did have an ultrasound which noted a gallstone.  Patient 

states that she has been working at the [employer] for the last week and a half

, and has been eating fried foods.  She has noticed an increase in right upper 

abdominal discomfort in the last 1-2 days, associated with "sour belching."  

She notes nausea without vomiting.  She denies any diarrhea, no fever or chills 

or urinary symptoms.  She presently rates her discomfort a 10/10.  She notes 

that she has been using a lot of ibuprofen for her recent left foot pain.  She 

denies hematemesis, melena or hematochezia.  She notes that she is scheduled to 

have a hysterectomy on .





Second, the patient complains of pain and swelling in her left foot.  She 

injured the foot a couple of weeks ago, and was seen and diagnosed with a 

possible "stress fracture."  She was given an ankle brace which she has been 

wearing.  As stated above while working at the [employer], she has been standing for 8

-12 hours a day, which has aggravated her left foot pain.  She has been taking 

ibuprofen.  She complains of pain and swelling in the foot.  Her symptoms are 

so severe that she needed to remove the brace today.


 (Amelia Winn PA)





Review of Systems


Review of systems as per HPI.  All other systems reviewed were negative.  10 

systems reviewed.


 (Amelia Winn PA)





Past Medical/Surgical History


Medical Problems:


(1) Acute bronchitis


(2) Adrenal nodule


(3) Asthma


(4) Asthma


(5) Bipolar disorder


(6) Bipolar Disorder, Unspecified


(7) Bronchitis


(8) Bronchitis


(9) Diab Dayanara Wo Compl, Type Ii Or Unspec Type, Not Uncntrld


(10) Fibromyalgia


(11) Generalized Anxiety Disorder


(12) Hepatic steatosis


(13) Hiatal hernia


(14) Hypersomnia With Sleep Apnea, Unspecified


(15) Hypertension Nos


(16) Idio Periph Neurpthy Nos


(17) Oral thrush


(18) Renal calculus


(19) Sinusitis


(20) Sinusitis


(21) Splenomegaly


(22) Strep pharyngitis


(23) Strep pharyngitis


(24) Symptoms of urinary tract infection


(25) Symptoms of urinary tract infection


(26) Viral upper respiratory tract infection with cough


Surgical Problems:


(1) History of appendectomy


(2) History of  section


(3) History of foot surgery


(4) History of knee surgery


(5) History of oral surgery


(6) History of reduction mammoplasty


(7) History of tonsillectomy


 (Santi Jacobsen M.D.)


Electronic medical records are reviewed and summarized as above/below.  See 

Problem List.


 (Amelia Winn PA)





Social History


Smoking Status:  Never Smoker


Alcohol Use:  occasionally


Marital Status:  


Housing Status:  lives with family


Occupation Status:  disabled


 (Amelia Winn PA)





Current/Historical Medications


Scheduled


Butalbital-Acetaminophen-Caffe (Fioricet), 1-2 CAP PO UD


Cariprazine HCl (Vraylar), 3 MG PO DAILY


Difluprednate (Durezol), 1 DROP OPB UD


Diphenhydramine Hcl (Benadryl), 25-75 MG PO QPM


Duloxetine HCl (Duloxetine HCl), 30 MG PO QPM


Duloxetine HCl (Duloxetine HCl), 60 MG PO QAM


Fexofenadine-Pseudoephedrine (Allegra-D 24 Hour Allergy), 1 TAB PO QPM


Fluticasone Prop/Salmeterol (Advair Diskus 250/50 60 Dose), 1 PUFF INH BID


Fluticasone Propionate (Fluticasone Propionate), 2 SPRAYS LYNN QPM


Labetalol HCl (Labetalol HCl), 200 MG PO BID


Lorazepam (Lorazepam), 0.5-1 MG PO HS


Melatonin (Melatonin Maximum Strengt), 2 TAB PO HS


Multivitamin (Multivitamin), 1 TAB PO QPM


Pantoprazole (Protonix), 40 MG PO QPM


Pregabalin (Lyrica), 150 MG PO TID


Probiotic Product (Probiotic), 1 TAB PO BID


Sitagliptin (Januvia), 100 MG PO QPM


Topiramate (Topamax), 200 MG PO QAM


Topiramate (Topamax), 100 MG PO QPM


Trazodone Hcl (Desyrel),  MG PO HS


Zafirlukast (Accolate), 20 MG PO BID





Scheduled PRN


Cyclobenzaprine HCl (Cyclobenzaprine HCl), 10 MG PO TID PRN for Muscle Spasm


Olopatadine HCl (Olopatadine Hydrochloride), 1 DROP OP DAILY PRN for Allergy 

Symptoms


[Proair], 2 PUFFS INH Q4 PRN for SOB/Wheezing





Physical Exam


Vital Signs











  Date Time  Temp Pulse Resp B/P (MAP) Pulse Ox O2 Delivery O2 Flow Rate FiO2


 


18 23:18  83 15 144/90 97   


 


18 22:18  78 15 131/87 99 Room Air  


 


18 19:41 37.2 84 20 143/91 98 Room Air  





 (Santi Jacobsen M.D.)





Physical Exam


CONSTITUTIONAL: Patient is an obese 43-year-old female who is awake and alert 

and lying on the gurney in mild distress, holding her right upper quadrant.


EYES:  Pupils equal, round, reactive to light and accommodation.  EOMs intact 

without nystagmus.  Sclera are anicteric. 


ENT:  Tympanic membranes intact, with normal landmarks.  External canals are 

clear.  Oral and nasopharynx are clear.  Mucous membranes are moist, no lesions

, tongue and gums appear normal.     


CARDIOVASCULAR: Regular rate and rhythm, with normal S1 and S2, no murmur or 

gallop or rub is heard.  No carotid bruits auscultated.  No JVD.  Peripheral 

pulses easy to palpable.


RESPIRATORY: Breath sounds equal and clear to auscultation without wheezes, 

rales, or rhonchi heard.   Full and equal chest expansion without accessory 

muscle use or retractions. 


GI: Bowel sounds are present.  Abdomen is soft, obese, not significantly 

distended.  She is tender to percussion and palpation in the right upper 

quadrant/right mid abdomen.  Negative Blankenship sign. No pulsatile masses.  No 

guarding or rebound.


MUSCULOSKELETAL: Examination of the left foot notes mild dorsal lateral soft 

tissue swelling.  She is primarily tender over the fourth and fifth 

metatarsals.  There is no erythema, increased warmth or induration.  No 

ecchymosis noted.  The right lower extremity is neurovascularly intact.


INTEGUMENTARY: No lesions or rash, normal skin turgor. 


NEUROLOGICAL: Alert, oriented, and cooperative.  Cranial nerves, sensation and 

strength grossly intact.  Pupils round, equal, and react to light, EOMs are 

full.


LYMPH: No lymphadenopathy.


 (Amelia Winn,PA)





Medical Decision & Procedures


ER Provider


Diagnostic Interpretation:


GALLBLADDER-ABD LIMITED





CLINICAL HISTORY: 43 years-old Female presenting with right upper quadrant pain.








TECHNIQUE: Real-time grayscale and limited color Doppler ultrasound imaging of


the abdomen limited to the right upper quadrant was performed. 





COMPARISON: 7/3/2018 and CT from 2017.





FINDINGS:





Pancreas: Visualized portions of the pancreatic head and body normal.





Liver: Moderately hyperechogenic parenchyma with partial obscuration of the


right hemidiaphragm, likely indicating moderate steatosis. The liver measures


18.2 cm in maximal sagittal dimension. No sonographic evidence of hepatic mass.


Main portal vein patent with normal directional flow.





Biliary: No intrahepatic biliary ductal dilatation. Common bile duct measures up


to 3 mm in diameter.





Gallbladder: Gallbladder sludge. No evidence of gallstones, gallbladder wall


thickening, gallbladder distention, or pericholecystic fluid or inflammatory


change. Sonographic Blankenship's sign negative.





Right kidney: Normal in appearance without evidence of hydronephrosis.





Ascites: None.





Other: None.





IMPRESSION: 


1.  Gallbladder sludge. No cholelithiasis or biliary ductal dilatation. No


convincing evidence of cholecystitis.





2.  Hepatic steatosis. Correlate with liver function tests to exclude


steatohepatitis as a cause for abdominal pain.





ABDOMEN 2VIEW W/PA CHEST RTN





CLINICAL HISTORY: 43 years-old Female presenting with EVAL ABD PAIN, severe left


foot pain, diabetes, right upper quadrant pain, indigestion. 





TECHNIQUE: PA view of the chest and supine and upright views of the abdomen were


obtained.





COMPARISON: 2017.





FINDINGS:


Cardiomediastinal silhouette normal. No focal opacity. No large effusion or


pneumothorax. 





Mottled lucencies in the left upper quadrant may relate to ingested material


within the stomach lumen. Gaseous distention of large bowel. Mild stool burden


in the left colon. No gross pneumoperitoneum.





Allowing for bowel gas and stool, no calcifications to suggest nephrolithiasis.


Multiple pelvic phleboliths.





Osseous structures normal.





IMPRESSION:


1.  No acute cardiopulmonary disease.





2.  No convincing evidence of bowel obstruction or free air. 








L FOOT MIN 3 VIEWS ROUTINE





CLINICAL HISTORY: 43 years-old Female presenting with LEFT, PAIN X 3 WEEKS. 





TECHNIQUE: Frontal, oblique, and lateral views of the left foot were obtained. 





COMPARISON: None.





FINDINGS:


A cannula screw through the interphalangeal joint of the first toe. Radiolucency


noted along the screw in the proximal phalanx. No osseous erosion at the


interphalangeal joint suggests septic arthritis. 





Accessory navicular noted. No acute fracture or malalignment. Small enthesophyte


at the origin of the plantar fascia. No radiographic soft tissue abnormality.





IMPRESSION:


Radiolucency surrounds the cannula screw at the interphalangeal joint of the


first toe. This raises concern for loosening or infection. 


 (Amelia Winn,PA)





Laboratory Results


18 20:40








Red Blood Count 4.29, Mean Corpuscular Volume 94.4, Mean Corpuscular Hemoglobin 

30.8, Mean Corpuscular Hemoglobin Concent 32.6, Mean Platelet Volume 10.1, 

Neutrophils (%) (Auto) 64.0, Lymphocytes (%) (Auto) 27.6, Monocytes (%) (Auto) 

5.1, Eosinophils (%) (Auto) 2.5, Basophils (%) (Auto) 0.3, Neutrophils # (Auto) 

5.64, Lymphocytes # (Auto) 2.43, Monocytes # (Auto) 0.45, Eosinophils # (Auto) 

0.22, Basophils # (Auto) 0.03





18 20:40

















Test


  18


00:00 18


20:40


 


Urine Color DK YELLOW  


 


Urine Appearance CLEAR (CLEAR)  


 


Urine pH 5.0 (4.5-7.5)  


 


Urine Specific Gravity


  1.032


(1.000-1.030) 


 


 


Urine Protein NEG (NEG)  


 


Urine Glucose (UA) NEG (NEG)  


 


Urine Ketones NEG (NEG)  


 


Urine Occult Blood NEG (NEG)  


 


Urine Nitrite NEG (NEG)  


 


Urine Bilirubin NEG (NEG)  


 


Urine Urobilinogen NEG (NEG)  


 


Urine Leukocyte Esterase TRACE (NEG)  


 


Urine WBC (Auto)


  10-30 /hpf


(0-5) 


 


 


Urine RBC (Auto) 0-4 /hpf (0-4)  


 


Urine Hyaline Casts (Auto) 1-5 /lpf (0-5)  


 


Urine Epithelial Cells (Auto) 0-5 /lpf (0-5)  


 


Urine Bacteria (Auto) NEG (NEG)  


 


Urine Pregnancy Test NEG (NEG)  


 


White Blood Count


  


  8.81 K/uL


(4.8-10.8)


 


Red Blood Count


  


  4.29 M/uL


(4.2-5.4)


 


Hemoglobin


  


  13.2 g/dL


(12.0-16.0)


 


Hematocrit  40.5 % (37-47) 


 


Mean Corpuscular Volume


  


  94.4 fL


()


 


Mean Corpuscular Hemoglobin


  


  30.8 pg


(25-34)


 


Mean Corpuscular Hemoglobin


Concent 


  32.6 g/dl


(32-36)


 


Platelet Count


  


  276 K/uL


(130-400)


 


Mean Platelet Volume


  


  10.1 fL


(7.4-10.4)


 


Neutrophils (%) (Auto)  64.0 % 


 


Lymphocytes (%) (Auto)  27.6 % 


 


Monocytes (%) (Auto)  5.1 % 


 


Eosinophils (%) (Auto)  2.5 % 


 


Basophils (%) (Auto)  0.3 % 


 


Neutrophils # (Auto)


  


  5.64 K/uL


(1.4-6.5)


 


Lymphocytes # (Auto)


  


  2.43 K/uL


(1.2-3.4)


 


Monocytes # (Auto)


  


  0.45 K/uL


(0.11-0.59)


 


Eosinophils # (Auto)


  


  0.22 K/uL


(0-0.5)


 


Basophils # (Auto)


  


  0.03 K/uL


(0-0.2)


 


RDW Standard Deviation


  


  49.0 fL


(36.4-46.3)


 


RDW Coefficient of Variation


  


  14.3 %


(11.5-14.5)


 


Immature Granulocyte % (Auto)  0.5 % 


 


Immature Granulocyte # (Auto)


  


  0.04 K/uL


(0.00-0.02)


 


Anion Gap


  


  9.0 mmol/L


(3-11)


 


Est Creatinine Clear Calc


Drug Dose 


  114.2 ml/min 


 


 


Estimated GFR (


American) 


  109.6 


 


 


Estimated GFR (Non-


American 


  94.6 


 


 


BUN/Creatinine Ratio  13.0 (10-20) 


 


Calcium Level


  


  9.0 mg/dl


(8.5-10.1)


 


Total Bilirubin


  


  0.3 mg/dl


(0.2-1)


 


Aspartate Amino Transf


(AST/SGOT) 


  21 U/L (15-37) 


 


 


Alanine Aminotransferase


(ALT/SGPT) 


  38 U/L (12-78) 


 


 


Alkaline Phosphatase


  


  103 U/L


()


 


Total Protein


  


  7.0 gm/dl


(6.4-8.2)


 


Albumin


  


  3.5 gm/dl


(3.4-5.0)


 


Globulin


  


  3.5 gm/dl


(2.5-4.0)


 


Albumin/Globulin Ratio  1.0 (0.9-2) 


 


Lipase


  


  82 U/L


()





 (Santi Jacobsen M.D.)





Medications Administered











 Medications


  (Trade)  Dose


 Ordered  Sig/Scott


 Route  Start Time


 Stop Time Status Last Admin


Dose Admin


 


 Sodium Chloride  1,000 ml @ 


 999 mls/hr  Q1H1M STAT


 IV  18 20:24


 18 21:24 DC 18 20:44


999 MLS/HR


 


 Ondansetron HCl


  (Zofran Inj)  4 mg  NOW  STAT


 IV  18 20:24


 18 20:27 DC 18 20:43


4 MG


 


 Famotidine


  (Pepcid 20mg Iv


 Push)  20 mg  ONE  STAT


 IV  18 20:24


 18 20:27 DC 18 20:43


20 MG


 


 Ketorolac


 Tromethamine


  (Toradol Inj)  30 mg  NOW  STAT


 IV  18 22:35


 18 22:36 DC 18 22:51


30 MG


 


 Acetaminophen/


 Hydrocodone Bitart


  (Norco 5/325mg


 Home Pack)  1 homepack  UD  ONCE


 PO  18 23:15


 18 23:16 DC 18 23:14


1 HOMEPACK


 


 Ondansetron HCl


  (ZOFRAN ODT 4MG


 Home Pack)  1 homepack  UD  ONCE


 PO  18 23:15


 18 23:16 DC 18 23:14


1 HOMEPACK





 (Santi Jacobsen M.D.)





ED Course


The patient was seen and evaluated as above.  She presents to the emergency 

department for evaluation of right upper quadrant pain, with a feeling new 

diagnosis of cholelithiasis.  She has been eating fatty/greasy foods recently 

which may have exacerbated her symptoms.  She also has ongoing left foot pain.  

IV lock was initiated and laboratory studies were collected.  Urinalysis, CBC 

with differential, CMP, lipase and urine pregnancy test were performed.  Acute 

abdominal series and left foot x-rays were obtained.  The patient had driven 

herself to the emergency department and was unable to find transportation home, 

therefore she was medicated with Zofran 4 mg and Pepcid 20 mg IV.  Right upper 

quadrant ultrasound was ordered.





Laboratory studies noted a normal white count at 8800, no left shift noted.  H&

H is normal.  Electrolytes are within normal limits.  Renal functions are 

normal.  Transaminases are not elevated.  Lipase is not indicative of acute 

pancreatitis.  Urinalysis shows trace leuk esterase and 10-30 WBCs.  No other 

indicators for infection.  Urine pregnancy test is negative.





Acute abdominal series was unremarkable.  No acute cardiopulmonary process or 

evidence for bowel obstruction or free air.  Left foot x-ray noted no evidence 

for acute fracture or malalignment.  Lucency around the cannulated screw at the 

IP joint of the great toe was suspicious for loosening, less likely infection.  

These x-ray findings were discussed with the patient, and loosening of the 

screw is known to her.  Clinically her exam is not consistent with infection.





Right upper quadrant ultrasound noted gallbladder sludge no evidence for 

gallstones, gallbladder wall thickening or distention.  No pericholecystic 

fluid or inflammatory changes.  No intrahepatic biliary ductal dilatation.  

Hepatic steatosis was noted.





The patient was reassessed.  She was still mildly uncomfortable and was given 

Toradol IV.  All laboratory and diagnostic imaging studies were reviewed with 

her.  As is known to her, she does have some gallbladder disease.  Her symptoms 

may have been exacerbated by her dietary indiscretions over the last week or so

, her ulcer disease could also have been aggravated by her ibuprofen use due to 

her left foot pain.  Nonetheless, she does not appear to have an acute 

cholecystitis at this time.  I suspect that she may benefit from surgical 

evaluation, but certainly does not require emergent cholecystectomy at this 

time.  Supportive care measures were discussed.  The patient is established 

with orthopedics/podiatry, and was placed in a postoperative shoe for support.  

She was encouraged to follow-up with them for further care and management of 

her left foot pain.  With regards to her biliary colic, the patient was advised 

to follow-up with surgery for further care and evaluation.  She was educated on 

the worrisome signs or symptoms she should return to the emergency department 

for including but not limiting to worsening pain, fever or vomiting.  She was 

encouraged to follow a bland, easily digestible diet.  Avoid greasy or fatty 

foods.  The patient did report that she felt better after the Toradol.  She 

rated her discomfort a 2/10 at discharge.  She was given a Zofran and Norco 

home pack.





Differential diagnoses entertained included GERD, gastritis, esophagitis, 

peptic ulcer disease, pancreatitis, acute cholecystitis, cholelithiasis, 

ascending cholangitis, bowel obstruction, perforation, among others.





 (Amelia Winn PA)





Medical Decision


See emergency department course.


 (Amelia Winn PA)





[place] Drug Monitoring Program


Search Results:  patient reviewed within database, see additional documentation 

(Patient received multiple controlled substances, primarily for psychiatric 

diagnoses and migraines.  No recent narcotic prescriptions.)


 (Amelia Winn PA)





Medication Reconcilliation


Current Medication List:  was personally reviewed by me


 (Amelia Winn PA)





Blood Pressure Screening


Patient's blood pressure:  Normal blood pressure


Blood pressure disposition:  Did not require urgent referral


 (Amelia Winn PA)





Impression





 Primary Impression:  


 Biliary colic


 Additional Impressions:  


 Gallbladder sludge


 Left foot pain





Departure Information


Referrals


ProCheo M.D. (PCP)





Patient Instructions


My [facility]





Additional Instructions





Hydrocodone/Acetaminophen (Norco) 5/325 mg: Take 1-2 pills every four hours for 

breakthrough pain.  Avoid alcohol, operating machinery or dangerous equipment, 

working on ladders or roofs, DRIVING, or situations where being under the 

influence may be dangerous.  It is recommended to use an over-the-counter stool 

softener such as Colace, 100mg twice daily while taking this medication to 

avoid constipation.





Acetaminophen(Tylenol) may be used for fever or pain.  Use 1000mg every eight 

hours as needed.  Avoid using more than 3000mg in a 24 hour period.  This is 

available over the counter.





Zofran(odansetron) tablets 4mg:  Take one and allow it to dissolve in your 

mouth every four hours as needed for nausea or vomiting.





Read all the package inserts or medication information paperwork provided.  If 

you have any questions or concerns call your primary provider, pharmacist or 

the ER for assistance.





Rest and drink plenty of fluids as tolerated.  Slow sips of water or sports 

drinks are recommended instead of large amounts all at once.  





Continue current medications.





Once your stomach is settled start with a clear liquid diet (jello, soup broth, 

etc.) and then advance as tolerated.  You should avoid full, heavy meals for 

about 24 hrs from the time your symptoms resolved.  No greasy, fatty or acidic 

foods.





Return to the ER immediately for worsening or persistent abdominal pain, 

vomiting, fevers, chest pains, difficulty breathing, black or bloody stools, 

worsening of your condition, or as needed.





Follow up with your primary physician in 1-2 days for a recheck of your current 

condition.  





Follow-up with general surgery for further care and management of your 

gallbladder symptoms.





Follow-up with your podiatrist for recheck of your left foot pain.





Problem Qualifiers

## 2018-08-24 NOTE — POLYSOMNOGRAPH REPORT
CLINICAL DATA:  [age]-year-old female with BMI of 38.6, referred by Dr. Cox for evaluation of sleep apnea.  The patient previously had sleep apnea

and was on CPAP.  She lost weight and was not using it anymore, but has

gained some weight back and some of her symptoms have worsened.  On the

evening of 08/20/2018, a home sleep apnea test was performed using a Amia[employer]

type 3 monitor.

 

RECORDING RESULTS:  Total recording time was 10 hours.  The patient

monitoring time and estimated sleep time was 9.2 hours.

 

RESPIRATORY DATA:  There was no evidence of clinically significant sleep

apnea seen.  The JUAN MIGUEL was 3.8.  There were 6 obstructive apneic episodes and

29 hypopneic episodes.  The longest respiratory event was 30 seconds.

 

OXIMETRY DATA:  Transient hypoxemia was seen.  Oxygen kade was 83%.  Mean

saturation was 93%.  Time below 89% was 3 minutes.

 

HEART RATE DATA:  Heart rates ranged from 59-72 beats per minute.

 

SNORING DATA:  Snoring was recorded throughout the night.

 

IMPRESSION:  No evidence of clinically significant sleep apnea/hypopnea or

sustained nocturnal hypoxemia.

 

RECOMMENDATIONS:  The patient should continue to practice good sleep hygiene.

Weight loss may be of benefit.  If there is a high index of suspicion of

sleep apnea, an in-lab sleep study may be needed.  Clinical correlation is

needed.

 

 

MARCO